# Patient Record
(demographics unavailable — no encounter records)

---

## 2025-05-27 NOTE — HEALTH RISK ASSESSMENT
[Good] : ~his/her~  mood as  good [Yes] : Yes [Monthly or less (1 pt)] : Monthly or less (1 point) [1 or 2 (0 pts)] : 1 or 2 (0 points) [Never (0 pts)] : Never (0 points) [No falls in past year] : Patient reported no falls in the past year [0] : 2) Feeling down, depressed, or hopeless: Not at all (0) [PHQ-2 Negative - No further assessment needed] : PHQ-2 Negative - No further assessment needed [Never] : Never [NO] : No [With Significant Other] : lives with significant other [Employed] : employed [Feels Safe at Home] : Feels safe at home [Fully functional (bathing, dressing, toileting, transferring, walking, feeding)] : Fully functional (bathing, dressing, toileting, transferring, walking, feeding) [Fully functional (using the telephone, shopping, preparing meals, housekeeping, doing laundry, using] : Fully functional and needs no help or supervision to perform IADLs (using the telephone, shopping, preparing meals, housekeeping, doing laundry, using transportation, managing medications and managing finances) [Reports normal functional visual acuity (ie: able to read med bottle)] : Reports normal functional visual acuity [College] : College [] :  [Smoke Detector] : smoke detector [Seat Belt] :  uses seat belt [Audit-CScore] : 1 [de-identified] : less exercise [de-identified] : poor  [LTV6Mytli] : 0 [de-identified] : Using nicotine patches more recently [Reports changes in hearing] : Reports no changes in hearing [Reports changes in vision] : Reports no changes in vision [Reports changes in dental health] : Reports no changes in dental health [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

## 2025-05-27 NOTE — HISTORY OF PRESENT ILLNESS
[FreeTextEntry1] : Physical Exam.  [de-identified] : Mr. JAYSON BANSAL is a 39 year old male here today for annual Mild elevation of LDL cholesterol- okay with statin if still high,  Hx of elevated BP- 140's/100's at home. Has seen cardiology -- Coronary calcium score was 0  c/o more frequent urination. Gets up at night. Force of stream is good . Drinks alot of caffeine ( 2 cups of coffee per day)  c/o reduced libido and ED NIKOLE: No

## 2025-05-27 NOTE — REVIEW OF SYSTEMS
[Recent Change In Weight] : ~T recent weight change [Nocturia] : nocturia [Frequency] : frequency [Impotence] : impotence [Anxiety] : anxiety [Negative] : Musculoskeletal [Vision Problems] : no vision problems [FreeTextEntry4] : tinnitus both ears [FreeTextEntry6] : snoring- due for sleep study [de-identified] : gets tinea versicolor